# Patient Record
(demographics unavailable — no encounter records)

---

## 2025-01-07 NOTE — HISTORY OF PRESENT ILLNESS
[FreeTextEntry8] : got sick 12/22 fever cough aches sore throat then got GI issues- vomiting and diarrhea now having persistent cough and headaches  likely had the flu - daughter had the flu

## 2025-01-07 NOTE — HEALTH RISK ASSESSMENT
[No] : In the past 12 months have you used drugs other than those required for medical reasons? No [No falls in past year] : Patient reported no falls in the past year [0] : 2) Feeling down, depressed, or hopeless: Not at all (0) [de-identified] : No [de-identified] : No [de-identified] : Little activities  [de-identified] : Very little appetite

## 2025-01-22 NOTE — HISTORY OF PRESENT ILLNESS
[FreeTextEntry1] : ISH [de-identified] : The patient is a 43 year old F who presents to the office for an annual wellness examination and follow up of chronic medical conditions. She reports compliance with all prescribed medical therapy and dietary restrictions.  Patient notes recent norovirus. Now resolved and returned to baseline. Patient has recently connected with health  to assist with nutrition and exercise.

## 2025-01-22 NOTE — HEALTH RISK ASSESSMENT
[Good] : ~his/her~  mood as  good [No] : In the past 12 months have you used drugs other than those required for medical reasons? No [No falls in past year] : Patient reported no falls in the past year [0] : 2) Feeling down, depressed, or hopeless: Not at all (0) [HIV Test offered] : HIV Test offered [Hepatitis C test offered] : Hepatitis C test offered [Fully functional (bathing, dressing, toileting, transferring, walking, feeding)] : Fully functional (bathing, dressing, toileting, transferring, walking, feeding) [Fully functional (using the telephone, shopping, preparing meals, housekeeping, doing laundry, using] : Fully functional and needs no help or supervision to perform IADLs (using the telephone, shopping, preparing meals, housekeeping, doing laundry, using transportation, managing medications and managing finances) [Smoke Detector] : smoke detector [Carbon Monoxide Detector] : carbon monoxide detector [Seat Belt] :  uses seat belt [Sunscreen] : uses sunscreen [PHQ-2 Negative - No further assessment needed] : PHQ-2 Negative - No further assessment needed [Never] : Never [Audit-CScore] : 0 [de-identified] : not much [de-identified] : poor [YOW2Blonk] : 0 [de-identified] : no [Reports changes in hearing] : Reports no changes in hearing [Reports changes in vision] : Reports no changes in vision [MammogramComments] : now due [PapSmearComments] : now due [BoneDensityComments] : n/a [ColonoscopyComments] : n/a

## 2025-01-22 NOTE — ASSESSMENT
[FreeTextEntry1] : The patient is a 43 year old F who presents to the office for an annual wellness examination - Fasting labs to screen for anemia, electrolyte disturbances, DM, lipid disorders, and additional metabolic disorders drawn in office  - PHQ2 performed: negative - Immunizations: - Encouraged routine dental, vision, dermatology screenings & age-appropriate physical activity - Mammogram: now due  - Pap smear: now due     Where applicable: - Labs drawn in office. - Appropriate medication renewal(s) provided. - Provided scripts for necessary imaging and/or referrals.     Further management to be completed pending lab results and/or imaging studies. All of the patient's questions and concerns were answered in detail.

## 2025-07-02 NOTE — PHYSICAL EXAM
[No Acute Distress] : no acute distress [No Respiratory Distress] : no respiratory distress  [Normal Rate] : normal rate  [Non-distended] : non-distended

## 2025-07-02 NOTE — ASSESSMENT
[FreeTextEntry1] : Discussed typical changes with altitude// change sin cabin pressure including dehydration, constipation, increased intestinal gas, changes in diet/routines - recommend increased hydration, increased fibrous foods, stool softener as needed - avoid airport food when possible

## 2025-07-02 NOTE — HEALTH RISK ASSESSMENT
[No] : In the past 12 months have you used drugs other than those required for medical reasons? No [No falls in past year] : Patient reported no falls in the past year [0] : 2) Feeling down, depressed, or hopeless: Not at all (0) [Never] : Never [Audit-CScore] : 0 [de-identified] : walking, excercise [de-identified] : ok [BII8Obplu] : 0

## 2025-07-02 NOTE — HISTORY OF PRESENT ILLNESS
[FreeTextEntry1] : abdominal pain, nausea with flying  [de-identified] : The patient is a 44-year-old F who presents to the office for routine follow up of chronic conditions.  Patient says after plane rides she is having abdominal pain, constipation, dry heaving, nausea  She will be flying this week